# Patient Record
Sex: FEMALE | Race: OTHER | HISPANIC OR LATINO | URBAN - METROPOLITAN AREA
[De-identification: names, ages, dates, MRNs, and addresses within clinical notes are randomized per-mention and may not be internally consistent; named-entity substitution may affect disease eponyms.]

---

## 2019-11-29 ENCOUNTER — EMERGENCY (EMERGENCY)
Facility: HOSPITAL | Age: 34
LOS: 0 days | Discharge: HOME | End: 2019-11-29
Admitting: EMERGENCY MEDICINE
Payer: MEDICAID

## 2019-11-29 VITALS
RESPIRATION RATE: 18 BRPM | HEART RATE: 73 BPM | DIASTOLIC BLOOD PRESSURE: 66 MMHG | OXYGEN SATURATION: 96 % | TEMPERATURE: 97 F | SYSTOLIC BLOOD PRESSURE: 132 MMHG
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
## 2019-11-29 DIAGNOSIS — S92.901A UNSPECIFIED FRACTURE OF RIGHT FOOT, INITIAL ENCOUNTER FOR CLOSED FRACTURE: ICD-10-CM

## 2019-11-29 DIAGNOSIS — S82.831A OTHER FRACTURE OF UPPER AND LOWER END OF RIGHT FIBULA, INITIAL ENCOUNTER FOR CLOSED FRACTURE: ICD-10-CM
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
## 2019-11-29 PROCEDURE — 73630 X-RAY EXAM OF FOOT: CPT | Mod: 26,RT

## 2019-11-29 PROCEDURE — 73590 X-RAY EXAM OF LOWER LEG: CPT | Mod: 26,RT

## 2019-11-29 PROCEDURE — 29515 APPLICATION SHORT LEG SPLINT: CPT

## 2019-11-29 PROCEDURE — 73610 X-RAY EXAM OF ANKLE: CPT | Mod: 26,RT

## 2019-11-29 PROCEDURE — 73562 X-RAY EXAM OF KNEE 3: CPT | Mod: 26,RT

## 2019-11-29 PROCEDURE — 99284 EMERGENCY DEPT VISIT MOD MDM: CPT | Mod: 25

## 2019-11-29 RX ORDER — IBUPROFEN 200 MG
600 TABLET ORAL ONCE
Refills: 0 | Status: COMPLETED | OUTPATIENT
Start: 2019-11-29 | End: 2019-11-29

## 2019-11-29 RX ADMIN — Medication 600 MILLIGRAM(S): at 11:32

## 2019-11-29 NOTE — ED ADULT TRIAGE NOTE - CHIEF COMPLAINT QUOTE
Patient fell down 5 stairs, complains of right foot pain, denies LOC, denies head trauma, denies blood thinners

## 2019-11-29 NOTE — ED PROVIDER NOTE - NSFOLLOWUPINSTRUCTIONS_ED_ALL_ED_FT
Follow up with your Primary Medical Doctor in 1-2 days as well as with orthopedist that will be provided to you upon discharge. Follow up with your Primary Medical Doctor in 1-2 days as well as with orthopedist that will be provided to you upon discharge.    Fibular Fracture, Adult    Tibial and fibular fracture is a break in the bones of your lower leg (tibia and fibula). The tibia is the larger of these two bones. The FIBULA (which you broke) is the smaller of the two bones. It is on the outer side of your leg.     CAUSES  Low-energy injuries, such as a fall from ground level.  High-energy injuries, such as motor vehicle injuries, gunshot wounds, or high-speed sports collisions.    RISK FACTORS  Jumping activities.  Repetitive stress, such as long-distance running.  Participation in sports.  Osteoporosis.  Advanced age.    SIGNS AND SYMPTOMS  Pain.  Swelling.  Inability to put weight on your injured leg.  Bone deformities at the site of your injury.  Bruising.    DIAGNOSIS  Tibial and fibular fractures are diagnosed with the use of X-ray exams.    TREATMENT  If you have a simple fracture of these two bones, they can be treated with simple immobilization. A cast or splint will be used on your leg to keep it from moving while it heals. Then you can begin range-of-motion exercises to regain your knee motion.    HOME CARE INSTRUCTIONS  Apply ice to your leg:  Put ice in a plastic bag.  Place a towel between your skin and the bag.  Leave the ice on for 20 minutes, 2–3 times a day.  If you have a plaster or fiberglass cast:  Do not try to scratch the skin under the cast using sharp or pointed objects.  Check the skin around the cast every day. You may put lotion on any red or sore areas.  Keep your cast dry and clean.  If you have a plaster splint:  Wear the splint as directed.  You may loosen the elastic around the splint if your toes become numb, tingle, or turn cold or blue.  Do not put pressure on any part of your cast or splint until it is fully hardened, because it may deform.  Your cast or splint can be protected during bathing with a plastic bag. Do not lower the cast or splint into water.  Use crutches as directed.  Only take over-the-counter or prescription medicines for pain, discomfort, or fever as directed by your health care provider.   Follow all instructions given to you by your health care provider.  Make and keep all follow-up appointments.     SEEK MEDICAL CARE IF:  Your pain is becoming worse rather than better or is not controlled with medicines.  You have increased swelling or redness in the foot.  You begin to lose feeling in your foot or toes.    SEEK IMMEDIATE MEDICAL CARE IF:  You develop a cold or blue foot or toes on the injured side.  You develop severe pain in your injured leg, especially if the pain is increased with movement of your toes.    MAKE SURE YOU:  Understand these instructions.   Will watch your condition.  Will get help right away if you are not doing well or get worse.    ADDITIONAL NOTES AND INSTRUCTIONS    Please follow up with your Primary MD in 24-48 hr.  Seek immediate medical care for any new/worsening signs or symptoms.     Foot Fracture in Adults    WHAT YOU NEED TO KNOW:    A foot fracture is a break in one or more of the bones in your foot. Foot fractures are commonly caused by trauma, falls, or repeated stress injuries. Foot Anatomy         DISCHARGE INSTRUCTIONS:    Medicines:     Antibiotics: This medicine is given to help treat or prevent an infection caused by bacteria.       NSAIDs: These medicines decrease swelling and pain. NSAIDs are available without a doctor's order. Ask which medicine is right for you. Ask how much to take and when to take it. Take as directed. NSAIDs can cause stomach bleeding and kidney problems if not taken correctly.      Pain medicine: You may be given a prescription medicine to decrease pain. Do not wait until the pain is severe before you take this medicine.      Take your medicine as directed. Contact your healthcare provider if you think your medicine is not helping or if you have side effects. Tell him of her if you are allergic to any medicine. Keep a list of the medicines, vitamins, and herbs you take. Include the amounts, and when and why you take them. Bring the list or the pill bottles to follow-up visits. Carry your medicine list with you in case of an emergency.    Follow up with your healthcare provider or bone specialist as directed: You may need to return to have your splint or stitches removed. You may also need to return for tests to make sure your foot is healing. Write down your questions so you remember to ask them during your visits.    Wound care: Carefully wash the wound with soap and water. Dry the area and put on new, clean bandages as directed. Change your bandages when they get wet or dirty.    Self-care:     Rest: You may need to rest your foot and avoid activities that cause pain. For stress fractures, you will need to avoid the activity that caused the fracture until it heals. Ask when you can return to your normal activities such as work and sports.      Ice: Ice helps decrease swelling and pain. Ice may also help prevent tissue damage. Use an ice pack or put crushed ice in a plastic bag. Cover it with a towel, and place it on your foot for 15 to 20 minutes every hour as directed.      Elevate your foot: Raise your foot at or above the level of your heart as often as you can. This will help decrease swelling and pain. Prop your foot on pillows or blankets to keep it elevated comfortably.       Physical therapy: Once your foot has healed, a physical therapist can teach you exercises to help improve movement and strength, and to decrease pain.    Splint care:     Check the skin around your splint daily for any redness or open areas.      Do not use a sharp or pointed object to scratch your skin under the splint.      Do not remove your splint unless your healthcare provider or orthopedic surgeon says it is okay.    Bathing with a splint: Do not let your splint get wet. Before bathing, cover the splint with a plastic bag. Tape the bag to your skin above the splint to seal out the water. Keep your foot out of the water in case the bag leaks. Ask when it is okay to take a bath or shower.    Assistive devices: You may be given a hard-soled shoe to wear while your foot is healing. You also may need to use crutches to help you walk while your foot heals. It is important to use your crutches correctly. Ask for more information about how to use crutches.    Contact your healthcare provider or bone specialist if:     You have a fever.      You have new sores around your boot or splint.      You have new or worsening trouble moving your foot.      You notice a foul smell coming from under your splint.      Your boot or splint gets damaged.      You have questions or concerns about your condition or care.    Return to the emergency department if:     The pain in your injured foot gets worse even after you rest and take pain medicine.      The skin or toes of your foot become numb, swollen, cold, white, or blue.      You have more pain or swelling than you did before the splint was put on.      Your wound is draining fluid or pus.      Blood soaks through your bandage.      Your leg feels warm, tender, and painful. It may look swollen and red.      You suddenly feel lightheaded and short of breath.      You have chest pain when you take a deep breath or cough. You may cough up blood.         © Copyright [employer] 2019 All illustrations and images included in CareNotes are the copyrighted property of [employer] or [employer].      Cast or Splint Care, Pediatric  Casts and splints are supports that are worn to protect broken bones and other injuries. A cast or splint may hold a bone still and in the correct position while it heals. Casts and splints may also help ease pain, swelling, and muscle spasms.    A cast is a hardened support that is usually made of fiberglass or plaster. It is custom-fit to the body and it offers more protection than a splint. It cannot be taken off and put back on. A splint is a type of soft support that is usually made from cloth and elastic. It can be adjusted or taken off as needed.    Your child may need a cast or a splint if he or she:  Has a broken bone.  Has a soft-tissue injury.  Needs to keep an injured body part from moving (keep it immobile) after surgery.  How to care for your child's cast  Do not allow your child to stick anything inside the cast to scratch the skin. Sticking something in the cast increases your child's risk of infection.  Check the skin around the cast every day. Tell your child's health care provider about any concerns.  You may put lotion on dry skin around the edges of the cast. Do not put lotion on the skin underneath the cast.  Keep the cast clean.  If the cast is not waterproof:  Do not let it get wet.  Cover it with a watertight covering when your child takes a bath or a shower.  How to care for your child's splint  Have your child wear it as told by your child's health care provider. Remove it only as told by your child's health care provider.  Loosen the splint if your child's fingers or toes tingle, become numb, or turn cold and blue.  Keep the splint clean.  If the splint is not waterproof:  Do not let it get wet.  Cover it with a watertight covering when your child takes a bath or a shower.  Follow these instructions at home:  Bathing     Do not have your child take baths or swim until his or her health care provider approves. Ask your child's health care provider if your child can take showers. Your child may only be allowed to take sponge baths for bathing.  If your child's cast or splint is not waterproof, cover it with a watertight covering when he or she takes a bath or shower.  Managing pain, stiffness, and swelling       Have your child move his or her fingers or toes often to avoid stiffness and to lessen swelling.  Have your child raise (elevate) the injured area above the level of his or her heart while he or she is sitting or lying down.  Safety     Do not allow your child to use the injured limb to support his or her body weight until your child's health care provider says that it is okay.  Have your child use crutches or other assistive devices as told by your child's health care provider.  General instructions     Do not allow your child to put pressure on any part of the cast or splint until it is fully hardened. This may take several hours.  Have your child return to his or her normal activities as told by his or her health care provider. Ask your child's health care provider what activities are safe for your child.  Give over-the-counter and prescription medicines only as told by your child's health care provider.  Keep all follow-up visits as told by your child’s health care provider. This is important.  Contact a health care provider if:  Your child’s cast or splint gets damaged.  Your child's skin under or around the cast becomes red or raw.  Your child’s skin under the cast is extremely itchy or painful.  Your child's cast or splint feels very uncomfortable.  Your child’s cast or splint is too tight or too loose.  Your child’s cast becomes wet or it develops a soft spot or area.  Your child gets an object stuck under the cast.  Get help right away if:  Your child's pain is getting worse.  Your child’s injured area tingles, becomes numb, or turns cold and blue.  The part of your child's body above or below the cast is swollen or discolored.  Your child cannot feel or move his or her fingers or toes.  There is fluid leaking through the cast.  Your child has severe pain or pressure under the cast.  This information is not intended to replace advice given to you by your health care provider. Make sure you discuss any questions you have with your health care provider.

## 2019-11-29 NOTE — ED PROVIDER NOTE - CARE PLAN
Principal Discharge DX:	Fibula fracture  Secondary Diagnosis:	Foot fracture  Secondary Diagnosis:	Status post fall

## 2019-11-29 NOTE — ED PROVIDER NOTE - CARE PROVIDERS DIRECT ADDRESSES
[email or web address]

## 2019-11-29 NOTE — ED PROVIDER NOTE - PATIENT PORTAL LINK FT
You can access the FollowMyHealth Patient Portal offered by [facility] by registering at the following website: [email or web address]. By joining [facility]’s FollowMyHealth portal, you will also be able to view your health information using other applications (apps) compatible with our system.

## 2019-11-29 NOTE — ED PROVIDER NOTE - OBJECTIVE STATEMENT
33 y/o F, PMHx Asthma, presents to the ED s/p mechanical fall this AM 33 y/o F, PMHx Asthma, presents to the ED s/p mechanical fall this AM. Pt states she was walking down the stairs, when she slipped and fell, resulting in direct trauma to her R lower leg/ankle/foot. Pt states she fell down (4) wood steps. Denies any head trauma, HA, neck or back pain. Pt currently c/o Leg pain, locates pain to R mid tib/fib, ankle and dorsal aspect of foot. Pain is moderate at this times, non radiating. Aggravated with weight bearing exercise. denies chest pain, SOB or abdominal pain

## 2019-11-29 NOTE — ED PROVIDER NOTE - NS ED ROS FT
Constitutional: (-) fever  Eyes/ENT: (-) blurry vision, (-) epistaxis  Cardiovascular: (-) chest pain, (-) syncope  Respiratory: (-) cough, (-) shortness of breath  Gastrointestinal: (-) vomiting, (-) diarrhea  Musculoskeletal: (-) neck pain, (-) back pain, (+) joint pain to mid tib/fib, R ankle and  foot dorsal aspect.  Integumentary: (-) rash, (-) edema  Neurological: (-) headache, (-) altered mental status  Psychiatric: (-) hallucinations  Allergic/Immunologic: (-) pruritus

## 2019-11-29 NOTE — ED PROVIDER NOTE - CARE PROVIDER_API CALL
Iisdro Flores (MD)  Orthopaedic Surgery  [address] [place] [address]  Phone: [phone]  Fax: [phone]  Follow Up Time: 1-3 Days Isidro Flores (MD)  Orthopaedic Surgery  [address] [place] [address]  Phone: [phone]  Fax: [phone]  Follow Up Time: 1-3 Days

## 2019-11-29 NOTE — ED PROVIDER NOTE - PHYSICAL EXAMINATION
Physical Exam    Vital Signs: I have reviewed the initial vital signs.  Constitutional: well-nourished, appears stated age, no acute distress  Eyes: Conjunctiva pink, Sclera clear, PERRLA, EOMI.  Musculoskeletal: supple neck, no lower extremity edema, no midline tenderness. Point tenderness to mid tib/fib, no bruising, swelling or crepitus noted. Point tenderness to R ankle over medial malleolus, mild swelling, no bruising or crepitus noted. Swelling, bruising noted over dorsal aspect of R foot, no crepitus noted. 2+ DP/PT pulses noted, no paresthesias noted   Integumentary: warm, dry, no rash  Neurologic: awake, alert, cranial nerves II-XII grossly intact, extremities’ motor and sensory functions grossly intact  Psychiatric: appropriate mood, appropriate affect